# Patient Record
Sex: FEMALE | Race: OTHER | HISPANIC OR LATINO
[De-identification: names, ages, dates, MRNs, and addresses within clinical notes are randomized per-mention and may not be internally consistent; named-entity substitution may affect disease eponyms.]

---

## 2018-06-07 PROBLEM — Z00.00 ENCOUNTER FOR PREVENTIVE HEALTH EXAMINATION: Status: ACTIVE | Noted: 2018-06-07

## 2018-06-08 ENCOUNTER — APPOINTMENT (OUTPATIENT)
Dept: UROGYNECOLOGY | Facility: CLINIC | Age: 68
End: 2018-06-08
Payer: MEDICARE

## 2018-06-08 ENCOUNTER — RESULT REVIEW (OUTPATIENT)
Age: 68
End: 2018-06-08

## 2018-06-08 VITALS
WEIGHT: 108 LBS | SYSTOLIC BLOOD PRESSURE: 144 MMHG | BODY MASS INDEX: 21.77 KG/M2 | HEIGHT: 59 IN | DIASTOLIC BLOOD PRESSURE: 80 MMHG | HEART RATE: 78 BPM

## 2018-06-08 DIAGNOSIS — Z63.4 DISAPPEARANCE AND DEATH OF FAMILY MEMBER: ICD-10-CM

## 2018-06-08 DIAGNOSIS — Z80.49 FAMILY HISTORY OF MALIGNANT NEOPLASM OF OTHER GENITAL ORGANS: ICD-10-CM

## 2018-06-08 DIAGNOSIS — Z78.9 OTHER SPECIFIED HEALTH STATUS: ICD-10-CM

## 2018-06-08 DIAGNOSIS — Z80.41 FAMILY HISTORY OF MALIGNANT NEOPLASM OF OVARY: ICD-10-CM

## 2018-06-08 DIAGNOSIS — I10 ESSENTIAL (PRIMARY) HYPERTENSION: ICD-10-CM

## 2018-06-08 LAB
BILIRUB UR QL STRIP: NORMAL
CLARITY UR: CLEAR
COLLECTION METHOD: NORMAL
GLUCOSE UR-MCNC: NORMAL
HCG UR QL: 0.2 EU/DL
HGB UR QL STRIP.AUTO: NORMAL
KETONES UR-MCNC: NORMAL
LEUKOCYTE ESTERASE UR QL STRIP: NORMAL
NITRITE UR QL STRIP: NORMAL
PH UR STRIP: 5.5
PROT UR STRIP-MCNC: NORMAL
SP GR UR STRIP: 1

## 2018-06-08 PROCEDURE — 99204 OFFICE O/P NEW MOD 45 MIN: CPT

## 2018-06-08 PROCEDURE — 81003 URINALYSIS AUTO W/O SCOPE: CPT | Mod: QW

## 2018-06-08 RX ORDER — RANITIDINE 150 MG/1
150 TABLET ORAL
Qty: 180 | Refills: 0 | Status: DISCONTINUED | COMMUNITY
Start: 2018-01-02

## 2018-06-08 RX ORDER — IBUPROFEN 200 MG/1
TABLET, COATED ORAL
Refills: 0 | Status: ACTIVE | COMMUNITY

## 2018-06-08 RX ORDER — NITROFURANTOIN (MONOHYDRATE/MACROCRYSTALS) 25; 75 MG/1; MG/1
100 CAPSULE ORAL
Qty: 14 | Refills: 0 | Status: DISCONTINUED | COMMUNITY
Start: 2018-02-17

## 2018-06-08 RX ORDER — ATENOLOL 25 MG/1
25 TABLET ORAL
Qty: 90 | Refills: 0 | Status: ACTIVE | COMMUNITY
Start: 2018-06-05

## 2018-06-08 SDOH — SOCIAL STABILITY - SOCIAL INSECURITY: DISSAPEARANCE AND DEATH OF FAMILY MEMBER: Z63.4

## 2018-06-11 ENCOUNTER — RESULT REVIEW (OUTPATIENT)
Age: 68
End: 2018-06-11

## 2018-06-11 LAB
APPEARANCE: CLEAR
BACTERIA UR CULT: NORMAL
BACTERIA: NEGATIVE
BILIRUBIN URINE: NEGATIVE
BLOOD URINE: NEGATIVE
COLOR: YELLOW
GLUCOSE QUALITATIVE U: NEGATIVE MG/DL
HYALINE CASTS: 1 /LPF
KETONES URINE: NEGATIVE
LEUKOCYTE ESTERASE URINE: NEGATIVE
MICROSCOPIC-UA: NORMAL
NITRITE URINE: NEGATIVE
PH URINE: 6.5
PROTEIN URINE: NEGATIVE MG/DL
RED BLOOD CELLS URINE: 0 /HPF
SPECIFIC GRAVITY URINE: 1.01
SQUAMOUS EPITHELIAL CELLS: 1 /HPF
UROBILINOGEN URINE: NEGATIVE MG/DL
WHITE BLOOD CELLS URINE: 0 /HPF

## 2018-06-27 ENCOUNTER — APPOINTMENT (OUTPATIENT)
Dept: UROGYNECOLOGY | Facility: CLINIC | Age: 68
End: 2018-06-27
Payer: MEDICARE

## 2018-06-27 PROCEDURE — 51729 CYSTOMETROGRAM W/VP&UP: CPT | Mod: 26

## 2018-06-27 PROCEDURE — 51784 ANAL/URINARY MUSCLE STUDY: CPT | Mod: 26

## 2018-06-27 PROCEDURE — 51797 INTRAABDOMINAL PRESSURE TEST: CPT | Mod: 26

## 2018-07-02 ENCOUNTER — APPOINTMENT (OUTPATIENT)
Dept: UROGYNECOLOGY | Facility: CLINIC | Age: 68
End: 2018-07-02
Payer: MEDICARE

## 2018-07-02 VITALS — HEART RATE: 72 BPM | DIASTOLIC BLOOD PRESSURE: 80 MMHG | SYSTOLIC BLOOD PRESSURE: 142 MMHG

## 2018-07-02 LAB
BILIRUB UR QL STRIP: NORMAL
CLARITY UR: CLEAR
COLLECTION METHOD: NORMAL
GLUCOSE UR-MCNC: NORMAL
HCG UR QL: 0.2 EU/DL
HGB UR QL STRIP.AUTO: NORMAL
KETONES UR-MCNC: NORMAL
LEUKOCYTE ESTERASE UR QL STRIP: NORMAL
NITRITE UR QL STRIP: NORMAL
PH UR STRIP: 6.5
PROT UR STRIP-MCNC: NORMAL
SP GR UR STRIP: 1.01

## 2018-07-02 PROCEDURE — 52000 CYSTOURETHROSCOPY: CPT

## 2018-07-09 ENCOUNTER — APPOINTMENT (OUTPATIENT)
Dept: UROGYNECOLOGY | Facility: CLINIC | Age: 68
End: 2018-07-09
Payer: MEDICARE

## 2018-07-09 PROCEDURE — 99214 OFFICE O/P EST MOD 30 MIN: CPT

## 2018-08-08 ENCOUNTER — APPOINTMENT (OUTPATIENT)
Dept: UROGYNECOLOGY | Facility: CLINIC | Age: 68
End: 2018-08-08
Payer: MEDICARE

## 2018-08-08 PROCEDURE — 99213 OFFICE O/P EST LOW 20 MIN: CPT | Mod: GC

## 2019-04-29 ENCOUNTER — APPOINTMENT (OUTPATIENT)
Dept: UROGYNECOLOGY | Facility: CLINIC | Age: 69
End: 2019-04-29
Payer: MEDICARE

## 2019-04-29 PROCEDURE — 99214 OFFICE O/P EST MOD 30 MIN: CPT

## 2019-04-29 NOTE — DISCUSSION/SUMMARY
[FreeTextEntry1] : 68 yo with history of outside vaginal mesh placement, followed by MUS requiring revision at [facility] now with recurrent LIVIER and VVA. \par \par 1. VVA- non hormonal and hormonal management options reviewed with the patient and her daughter. The patient desires vaginal estrogen for her VVA. The risks, benefits, the relationship relating to estrogens and cancer were reviewed. She will begin with one tablet daily to the vagina for one week and then change to twice weekly. All questions answered. RTO in 2 months for follow-up.\par \par 2. recurrent LIVIER- We reviewed management options for stress urinary incontinence including: observation, pelvic floor exercises, continence devices, periurethral bulking agents, imipramine, and surgical management. Given her prior history of complications from vaginal mesh and midurethral sling placement we recommended periurethral bulking procedure for management. [employer] patient information on periurethral bulking procedure was provided to her in Albanian. We reviewed risks and benefits of the procedure including efficiency and complication of urinary retention. She desires to read the [employer] information we have provided for her and follow-up in 2 months with her decision. All questions answered.

## 2019-04-29 NOTE — PHYSICAL EXAM
[Well developed] : well developed [Well Nourished] : ~L well nourished [Bulbocavernous] : bulbocavernous was present [Anal Reflex] : the anal reflex was present [Normal Appearance] : ~T the appearance of the neck was normal [Warm and Dry] : was warm and dry to touch [Normal Gait] : gait was normal [Vulvar Atrophy] : vulvar atrophy [Atrophy] : atrophy [Normal] : was normal [Absent] : absent [Exam Deferred] : was deferred [Anxiety] : patient is not anxious [Cough] : no cough [No Edema] : ~T edema was present [Tenderness] : ~T no ~M abdominal tenderness observed [Distended] : not distended [Inguinal LAD] : no adenopathy was noted in the inguinal lymph nodes [FreeTextEntry3] : no mesh exposure, +tenderness at 5 o'clock with contraction band  [de-identified] : no prolapse

## 2019-04-29 NOTE — HISTORY OF PRESENT ILLNESS
[FreeTextEntry1] : 68 yo who presents with her daughter for follow-up. She was last seen in August 2018 for follow-up of her LIVIER. She has an extensive history of pelvic reconstruction surgery at [facility] which includes vaginal mesh, followed by a MUS, followed by a MUS revision. Her UDS revealed GSUI. Cystoscopy wnl. She was placed on imipramine which was increased in dosage at her last office visit which she self discontinued due to it not improving her symptoms. She is inquiring about periurethral bulking agent for management of her LIVIER today. She also reports dyspareunia and desires vaginal estrogen.

## 2019-04-29 NOTE — REASON FOR VISIT
[Follow-up Visit ___] : a follow-up visit  for [unfilled] [Family Member] : family member [Patient Declined  Services] : - None: Patient declined  services

## 2019-05-24 ENCOUNTER — RX RENEWAL (OUTPATIENT)
Age: 69
End: 2019-05-24

## 2019-05-24 RX ORDER — ESTRADIOL 10 UG/1
10 TABLET, FILM COATED VAGINAL
Qty: 30 | Refills: 5 | Status: ACTIVE | COMMUNITY
Start: 2019-04-29 | End: 1900-01-01

## 2019-07-01 ENCOUNTER — APPOINTMENT (OUTPATIENT)
Dept: UROGYNECOLOGY | Facility: CLINIC | Age: 69
End: 2019-07-01
Payer: MEDICARE

## 2019-07-01 PROCEDURE — 99214 OFFICE O/P EST MOD 30 MIN: CPT | Mod: GC

## 2019-07-01 NOTE — HISTORY OF PRESENT ILLNESS
[FreeTextEntry1] : 70yo presents with her daughter. She is s/p vaginal mesh and MUS followed by mesh revision. She presents for follow up for her VVA and LIVIER. She started vagifem with improvement in vaginal dryness and comfort w/ sexual intercours, however she stopped the estrogen therapy due to vaginal spotting. She experiences vaginal spotting every other day for the past 2-3 weeks. She denies any dysuria, fevers or chills, and denies any constipation. Her colonoscopy was last performed 3 years ago with no problems. For control of her LIVIER she was previously on imipramine. Though it did improve her LIVIER symptoms, she has not been taking it regularly. She is not interested in periurethral bulking at this time.

## 2019-07-01 NOTE — DISCUSSION/SUMMARY
[FreeTextEntry1] : 1. VVA - No discernible bleeding noted today. Udip performed with no evidence of hematuria. I explained that bleeding could be from insertion of applicator. She will continue Vagifem however instead of using the applicator she will insert the tablet with her finger. \par \par 2. LIVIER - She will resume Imipramine. She understands that she will need to take it consistently in order to continue seeing improvement. She has deferred periurethral bulking for now. \par \par She will return in 3 months for follow up.

## 2019-07-01 NOTE — REASON FOR VISIT
[Family Member] : family member [Patient Declined  Services] : - None: Patient declined  services [Source: ______] : History obtained from [unfilled]

## 2019-07-01 NOTE — PHYSICAL EXAM
[No Acute Distress] : in no acute distress [Well developed] : well developed [Well Nourished] : ~L well nourished [Oriented x3] : oriented to person, place, and time [Warm and Dry] : was warm and dry to touch [Normal Gait] : gait was normal [Labia Majora] : were normal [Normal Appearance] : general appearance was normal [Absent] : absent [Normal] : no abnormalities [Anxiety] : patient is not anxious [Tenderness] : ~T no ~M abdominal tenderness observed [Distended] : not distended [FreeTextEntry4] : No bleeding sites noted.  [de-identified] : No prolapse. No mesh exposure.

## 2019-07-25 ENCOUNTER — MESSAGE (OUTPATIENT)
Age: 69
End: 2019-07-25

## 2019-10-07 ENCOUNTER — APPOINTMENT (OUTPATIENT)
Dept: UROGYNECOLOGY | Facility: CLINIC | Age: 69
End: 2019-10-07
Payer: MEDICARE

## 2019-10-07 PROCEDURE — 99213 OFFICE O/P EST LOW 20 MIN: CPT

## 2019-10-07 NOTE — HISTORY OF PRESENT ILLNESS
[FreeTextEntry1] : Patient is a 70yo postmenopausal woman s/p vaginal mesh and MUS (followed by mesh revision) who presents for follow up of LIVIER and vulvovaginal atrophy. Currently on vagifem. At last visit reported some vaginal spotting associated with use and was instructed to forego use of applicator at that time. Since last visit reports no vaginal bleeding. However patient desires to switch to cream formulation, as she has taken in the past with reportedly increased symptom improvement. Also reports minimally improved urinary symptoms while on Imipramine. Has not taken the past 2-3 weeks as prescription ran out. ROS positive for new onset vulvar irritation. Denies fevers, chills, dysuria, etc.\par \par Patient interviewed with daughter as . Declines .

## 2019-10-07 NOTE — PHYSICAL EXAM
[Well Nourished] : ~L well nourished [Well developed] : well developed [Good Hygeine] : demonstrates good hygeine [Oriented x3] : oriented to person, place, and time [Warm and Dry] : was warm and dry to touch [Vulvar Atrophy] : vulvar atrophy [Normal Gait] : gait was normal [Labia Minora] : were normal [Labia Majora] : were normal [Atrophy] : atrophy [Normal] : was normal [No Acute Distress] : in acute distress [Distended] : not distended [Mass (___ Cm)] : no ~M [unfilled] abdominal mass was palpated [Tenderness] : ~T no ~M abdominal tenderness observed [Normal Appearance] : general appearance was normal [Axillary LAD] : no adenopathy was noted in axillary nodes [Absent] : absent [FreeTextEntry4] :  short canal

## 2019-10-07 NOTE — DISCUSSION/SUMMARY
[FreeTextEntry1] : Patient is a 70yo postmenopausal woman s/p vaginal mesh and MUS with mesh revision who presents for follow up of LIVIER and VVA. Patient declines any bulking agents at this time. Will continue imipramine. Will prescribe vaginal estrogen cream for vaginal atrophy. Instructed patient to try zinc oxide for vulvar irritation. Return to clinic in 6 months for follow up.

## 2019-10-16 ENCOUNTER — MEDICATION RENEWAL (OUTPATIENT)
Age: 69
End: 2019-10-16

## 2019-10-16 RX ORDER — IMIPRAMINE HYDROCHLORIDE 50 MG/1
50 TABLET ORAL
Qty: 90 | Refills: 0 | Status: ACTIVE | COMMUNITY
Start: 2018-07-09 | End: 1900-01-01

## 2020-07-27 ENCOUNTER — APPOINTMENT (OUTPATIENT)
Dept: UROGYNECOLOGY | Facility: CLINIC | Age: 70
End: 2020-07-27

## 2020-08-17 ENCOUNTER — APPOINTMENT (OUTPATIENT)
Dept: UROGYNECOLOGY | Facility: CLINIC | Age: 70
End: 2020-08-17
Payer: MEDICARE

## 2020-08-17 VITALS — TEMPERATURE: 78.3 F | SYSTOLIC BLOOD PRESSURE: 140 MMHG | DIASTOLIC BLOOD PRESSURE: 80 MMHG | HEART RATE: 73 BPM

## 2020-08-17 PROCEDURE — 99214 OFFICE O/P EST MOD 30 MIN: CPT

## 2020-08-17 NOTE — DISCUSSION/SUMMARY
[FreeTextEntry1] : For her vaginal bleeding, we discussed the etiology of mesh exposure vs vaginal atrophy. She will continue vaginal estrogen. We discussed continuing with vaginal estrogen only versus evaluating for Mesh erosion and surgical excision with vaginoscopy.We discussed the short vagina may be contributing to her dyspareunia. She would like to proceed with vaginoscopy. All questions were answered

## 2020-08-17 NOTE — PHYSICAL EXAM
[Chaperone Present] : A chaperone was present in the examining room during all aspects of the physical examination [No Acute Distress] : in no acute distress [Oriented x3] : oriented to person, place, and time [Normal Gait] : gait was normal [Labia Majora] : were normal [Atrophy] : atrophy [Normal Appearance] : general appearance was normal [Absent] : absent [Normal] : no abnormalities [Exam Deferred] : was deferred [No Edema] : ~T edema was present [Mass (___ Cm)] : no ~M [unfilled] abdominal mass was palpated [Tenderness] : ~T no ~M abdominal tenderness observed [Distended] : not distended [FreeTextEntry4] : No visible mesh seen or palpable however bleeding seemed to arise from right anterior sulci. Short vagina + 5cm

## 2020-08-17 NOTE — HISTORY OF PRESENT ILLNESS
[Cystocele (Obstetric)] : mild [Uterine Prolapse] : no [Stress Incontinence] : no [Unable To Restrain Bowel Movement] : no [Urinary Frequency] : no [Pain During Urination (Dysuria)] : no [Feelings Of Urinary Urgency] : no [x1] : nocturia once nightly [Urinary Tract Infection] : no [Constipation Obstructed Defecation] : no [Pelvic Pain] : no [Stool Visible Blood] : no [Incomplete Emptying Of Stool] : no [Vaginal Pain] : no [Rectal Pain] : no [Bladder Pain] : no [Vulvar Pain] : no [Back Pain] : no [Vaginal Wall Prolapse] : moderate [] : months ago [FreeTextEntry4] : daily for 6 months  [de-identified] : 6 [de-identified] : daily [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 She reports 6 months vaginal bleeding, minimal saturation 1 liner per day. last sexual intercourse 1 week ago, increased bleeding and pain. Saw gynecologist 1 month ago for a vaginal  laceration. Stopped vaginal estrogen 1 week ago. \par \par Previously Prescribed imipramine, stopped the medication, after 3 months, stopped after the Dr. Nix gave her another medication in 6/2020 (unsure of medication). \par \par Cysto 2018- negative \par UDS stress incontinence \par \par 2/23/2015- TOT - Dr. Nix \par 5/2016- excision of urethral sling (erosion) - Dr. Nix\par 2017- release of band?, mesh exposure?

## 2020-08-17 NOTE — REASON FOR VISIT
[Questionnaire Received] : Patient questionnaire received [Intake Form Reviewed] : Patient intake form with past medical history, surgical history, family history and social history reviewed today [Follow-Up Visit_____] : a follow-up visit for [unfilled] [________] : [unfilled] [Declined  Services] : Patient was offered free  services in ~his/her~ preferred language and declined services. Patient insisted on family member providing interpretation   [FreeTextEntry3] : patients daughter interpreted [TWNoteComboBox1] : Kazakh

## 2020-09-02 ENCOUNTER — APPOINTMENT (OUTPATIENT)
Dept: UROGYNECOLOGY | Facility: CLINIC | Age: 70
End: 2020-09-02
Payer: MEDICARE

## 2020-09-02 ENCOUNTER — OUTPATIENT (OUTPATIENT)
Dept: OUTPATIENT SERVICES | Facility: HOSPITAL | Age: 70
LOS: 1 days | End: 2020-09-02
Payer: COMMERCIAL

## 2020-09-02 DIAGNOSIS — N95.0 POSTMENOPAUSAL BLEEDING: ICD-10-CM

## 2020-09-02 DIAGNOSIS — N95.2 POSTMENOPAUSAL ATROPHIC VAGINITIS: ICD-10-CM

## 2020-09-02 DIAGNOSIS — Z01.818 ENCOUNTER FOR OTHER PREPROCEDURAL EXAMINATION: ICD-10-CM

## 2020-09-02 LAB
BILIRUB UR QL STRIP: NORMAL
CLARITY UR: CLEAR
COLLECTION METHOD: NORMAL
GLUCOSE UR-MCNC: NORMAL
HCG UR QL: 0.2 EU/DL
HGB UR QL STRIP.AUTO: NORMAL
KETONES UR-MCNC: NORMAL
LEUKOCYTE ESTERASE UR QL STRIP: NORMAL
NITRITE UR QL STRIP: NORMAL
PH UR STRIP: 7
PROT UR STRIP-MCNC: NORMAL
SP GR UR STRIP: 1.01

## 2020-09-02 PROCEDURE — 57420 EXAM OF VAGINA W/SCOPE: CPT

## 2020-12-09 ENCOUNTER — APPOINTMENT (OUTPATIENT)
Dept: UROGYNECOLOGY | Facility: CLINIC | Age: 70
End: 2020-12-09

## 2022-07-20 ENCOUNTER — APPOINTMENT (OUTPATIENT)
Dept: UROLOGY | Facility: CLINIC | Age: 72
End: 2022-07-20

## 2022-07-20 VITALS
TEMPERATURE: 97.2 F | SYSTOLIC BLOOD PRESSURE: 147 MMHG | WEIGHT: 118 LBS | HEIGHT: 59 IN | BODY MASS INDEX: 23.79 KG/M2 | HEART RATE: 78 BPM | DIASTOLIC BLOOD PRESSURE: 86 MMHG

## 2022-07-20 DIAGNOSIS — Z86.79 PERSONAL HISTORY OF OTHER DISEASES OF THE CIRCULATORY SYSTEM: ICD-10-CM

## 2022-07-20 PROCEDURE — 99204 OFFICE O/P NEW MOD 45 MIN: CPT

## 2022-07-20 RX ORDER — ATORVASTATIN CALCIUM 10 MG/1
10 TABLET, FILM COATED ORAL
Qty: 30 | Refills: 0 | Status: ACTIVE | COMMUNITY
Start: 2022-05-04

## 2022-07-20 RX ORDER — DENOSUMAB 60 MG/ML
60 INJECTION SUBCUTANEOUS
Qty: 1 | Refills: 0 | Status: ACTIVE | COMMUNITY
Start: 2022-06-03

## 2022-07-20 RX ORDER — MIRABEGRON 25 MG/1
25 TABLET, FILM COATED, EXTENDED RELEASE ORAL
Refills: 0 | Status: ACTIVE | COMMUNITY

## 2022-07-20 RX ORDER — FLUOCINONIDE 0.5 MG/ML
0.05 SOLUTION TOPICAL
Qty: 60 | Refills: 0 | Status: ACTIVE | COMMUNITY
Start: 2022-06-21

## 2022-07-20 RX ORDER — POLYETHYLENE GLYCOL 3350, SODIUM CHLORIDE, SODIUM BICARBONATE AND POTASSIUM CHLORIDE WITH LEMON FLAVOR 420; 11.2; 5.72; 1.48 G/4L; G/4L; G/4L; G/4L
420 POWDER, FOR SOLUTION ORAL
Qty: 4000 | Refills: 0 | Status: ACTIVE | COMMUNITY
Start: 2022-02-21

## 2022-07-20 RX ORDER — SERTRALINE 25 MG/1
25 TABLET, FILM COATED ORAL
Qty: 30 | Refills: 0 | Status: ACTIVE | COMMUNITY
Start: 2022-03-29

## 2022-07-20 RX ORDER — BISACODYL 5 MG/1
5 TABLET ORAL
Qty: 2 | Refills: 0 | Status: ACTIVE | COMMUNITY
Start: 2022-02-21

## 2022-07-20 RX ORDER — LOSARTAN POTASSIUM AND HYDROCHLOROTHIAZIDE 12.5; 1 MG/1; MG/1
100-12.5 TABLET ORAL
Qty: 90 | Refills: 0 | Status: ACTIVE | COMMUNITY
Start: 2022-07-10

## 2022-07-20 RX ORDER — LEVOTHYROXINE SODIUM 0.05 MG/1
50 TABLET ORAL
Qty: 64 | Refills: 0 | Status: ACTIVE | COMMUNITY
Start: 2022-06-15

## 2022-07-20 RX ORDER — CYCLOSPORINE 0.5 MG/ML
0.05 EMULSION OPHTHALMIC
Qty: 60 | Refills: 0 | Status: ACTIVE | COMMUNITY
Start: 2022-07-09

## 2022-07-20 RX ORDER — TRETINOIN 0.5 MG/G
0.05 CREAM TOPICAL
Qty: 45 | Refills: 0 | Status: ACTIVE | COMMUNITY
Start: 2022-06-21

## 2022-07-20 NOTE — ASSESSMENT
[FreeTextEntry1] : Ms. North is a very pleasant 72 year old woman here today for history of stress incontinence and increased urgency.\par She reports that she was seeing Dr. Castorena regularly for this but he moved offices and it is difficult for her to get to his new location.\par Reports that her stress incontinence has gotten slightly better with medication therapies.\par Takes 25 mg Myrbetriq daily as well as Estrace cream.\par Has a history of vaginal mesh insertion with removal due to erosion.\par Most recently had a vaginoscopy with Dr. Castorena in 2020 that showed no more evidence of vaginal mesh.\par Denies any hematuria, dysuria.\par Reports occasional pelvic pain/discomfort.\par UC.\par UA.\par Continue Estrace cream - refills provided.\par Trial Myrbetriq 50 mg.\par We discussed the risks and benefits of mirabegron, including the need to check blood pressure more frequently when starting the medication\par RTO in 1 month.\par

## 2022-07-20 NOTE — HISTORY OF PRESENT ILLNESS
[Currently Experiencing ___] :  [unfilled] [Urinary Incontinence] : urinary incontinence [None] : None [FreeTextEntry1] : Portuguese interpretation services offered to the patient, however she wished to speak without the use of an \par \par Ms. North is a very pleasant 72 year old woman here today for history of stress incontinence and and increased urgency.\par She reports that she was seeing Dr. Castorena regularly for this but he moved offices and it is difficult for her to get to his new location.\par Reports that her stress incontinence has gotten slightly better with medication therapies.\par Takes 25 mg Myrbetriq daily as well as Estrace cream.\par Has a history of vaginal mesh insertion with removal due to erosion.\par Most recently had a vaginoscopy with Dr. Castorena in 2020 that showed no more evidence of vaginal mesh.\par Denies any hematuria, dysuria.\par Reports occasional pelvic pain/discomfort.

## 2022-07-20 NOTE — REVIEW OF SYSTEMS
[Leakage of urine with urgency] : leakage of urine with urgency [Leakage of urine with straining, coughing, laughing] : leakage of urine with straining, coughing, laughing [Negative] : Heme/Lymph [FreeTextEntry6] : unable to hold urine  / has some discomfort on bladder

## 2022-07-20 NOTE — PHYSICAL EXAM
[General Appearance - Well Developed] : well developed [General Appearance - Well Nourished] : well nourished [Normal Appearance] : normal appearance [General Appearance - In No Acute Distress] : no acute distress [Well Groomed] : well groomed [Edema] : no peripheral edema [Respiration, Rhythm And Depth] : normal respiratory rhythm and effort [Exaggerated Use Of Accessory Muscles For Inspiration] : no accessory muscle use [Abdomen Soft] : soft [Abdomen Tenderness] : non-tender [Costovertebral Angle Tenderness] : no ~M costovertebral angle tenderness [Normal Station and Gait] : the gait and station were normal for the patient's age [] : no rash [No Focal Deficits] : no focal deficits [Oriented To Time, Place, And Person] : oriented to person, place, and time [Affect] : the affect was normal [Mood] : the mood was normal [Not Anxious] : not anxious [No Palpable Adenopathy] : no palpable adenopathy

## 2022-07-25 LAB
APPEARANCE: CLEAR
BACTERIA UR CULT: ABNORMAL
BACTERIA: ABNORMAL
BILIRUBIN URINE: NEGATIVE
BLOOD URINE: NEGATIVE
COLOR: NORMAL
GLUCOSE QUALITATIVE U: NEGATIVE
HYALINE CASTS: 0 /LPF
KETONES URINE: NEGATIVE
LEUKOCYTE ESTERASE URINE: NEGATIVE
MICROSCOPIC-UA: NORMAL
NITRITE URINE: NEGATIVE
PH URINE: 7
PROTEIN URINE: NEGATIVE
RED BLOOD CELLS URINE: 0 /HPF
SPECIFIC GRAVITY URINE: 1.01
SQUAMOUS EPITHELIAL CELLS: 2 /HPF
UROBILINOGEN URINE: NORMAL
WHITE BLOOD CELLS URINE: 0 /HPF

## 2022-07-25 RX ORDER — AMOXICILLIN AND CLAVULANATE POTASSIUM 875; 125 MG/1; MG/1
875-125 TABLET, COATED ORAL TWICE DAILY
Qty: 10 | Refills: 0 | Status: ACTIVE | COMMUNITY
Start: 2022-07-25 | End: 1900-01-01

## 2022-08-23 ENCOUNTER — APPOINTMENT (OUTPATIENT)
Dept: UROLOGY | Facility: CLINIC | Age: 72
End: 2022-08-23

## 2022-08-23 VITALS
HEART RATE: 94 BPM | DIASTOLIC BLOOD PRESSURE: 77 MMHG | BODY MASS INDEX: 20.6 KG/M2 | TEMPERATURE: 97.4 F | OXYGEN SATURATION: 100 % | WEIGHT: 102 LBS | SYSTOLIC BLOOD PRESSURE: 132 MMHG

## 2022-08-23 DIAGNOSIS — N39.0 URINARY TRACT INFECTION, SITE NOT SPECIFIED: ICD-10-CM

## 2022-08-23 PROCEDURE — 99213 OFFICE O/P EST LOW 20 MIN: CPT

## 2022-08-23 NOTE — ASSESSMENT
[FreeTextEntry1] : Very pleasant 72-year-old woman who presents for follow-up of stress incontinence, increased urinary urgency\par -Continue Estrace cream\par -Continue mirabegron, however decrease the dose of the medication to 25 mg\par -Urinalysis demonstrated 0 red blood cells per high-powered field but moderate bacteria\par -Urine culture demonstrated an Enterococcus faecalis urinary tract infection sensitive to Augmentin which she completed\par -Repeat urine culture today\par -Follow-up in 6 months if urine culture is negative

## 2022-08-23 NOTE — HISTORY OF PRESENT ILLNESS
[FreeTextEntry1] : Wolof interpretation services offered to the patient, however she wished to speak without the use of an \par \par Very pleasant 72 year old woman here today for follow up of stress incontinence and and increased urgency.\par She was previously seeing Dr. Castorena regularly for this but he moved offices and it is difficult for her to get to his new location.\par She reports stable stress incontinence.\par Uses Estrace cream.\par Has a history of vaginal mesh insertion with removal due to erosion.\par Most recently had a vaginoscopy with Dr. Castorena in 2020 that showed no more evidence of vaginal mesh.\par Denies any hematuria, dysuria.\par Reports occasional pelvic pain/discomfort.\par \par At last visit the dose of mirabegron was increased from 25 mg to 50 mg.  She reports no change in her symptoms with the increased dose of the medication

## 2022-08-29 ENCOUNTER — TRANSCRIPTION ENCOUNTER (OUTPATIENT)
Age: 72
End: 2022-08-29

## 2022-08-29 RX ORDER — MIRABEGRON 50 MG/1
50 TABLET, FILM COATED, EXTENDED RELEASE ORAL
Qty: 30 | Refills: 0 | Status: DISCONTINUED | COMMUNITY
Start: 2022-07-20 | End: 2022-08-29

## 2022-08-29 RX ORDER — MIRABEGRON 25 MG/1
25 TABLET, FILM COATED, EXTENDED RELEASE ORAL
Qty: 90 | Refills: 1 | Status: ACTIVE | COMMUNITY
Start: 2022-08-29 | End: 1900-01-01

## 2023-02-24 ENCOUNTER — APPOINTMENT (OUTPATIENT)
Dept: UROLOGY | Facility: CLINIC | Age: 73
End: 2023-02-24

## 2023-05-24 ENCOUNTER — APPOINTMENT (OUTPATIENT)
Dept: UROLOGY | Facility: CLINIC | Age: 73
End: 2023-05-24
Payer: MEDICARE

## 2023-05-24 VITALS
TEMPERATURE: 97.1 F | OXYGEN SATURATION: 99 % | SYSTOLIC BLOOD PRESSURE: 139 MMHG | HEART RATE: 104 BPM | HEIGHT: 59 IN | DIASTOLIC BLOOD PRESSURE: 84 MMHG | WEIGHT: 102 LBS | BODY MASS INDEX: 20.56 KG/M2

## 2023-05-24 DIAGNOSIS — R35.1 NOCTURIA: ICD-10-CM

## 2023-05-24 DIAGNOSIS — N39.3 STRESS INCONTINENCE (FEMALE) (MALE): ICD-10-CM

## 2023-05-24 PROCEDURE — 99214 OFFICE O/P EST MOD 30 MIN: CPT

## 2023-05-24 RX ORDER — ESTRADIOL 0.1 MG/G
0.1 CREAM VAGINAL
Qty: 1 | Refills: 3 | Status: ACTIVE | COMMUNITY
Start: 2019-10-16 | End: 1900-01-01

## 2023-05-24 NOTE — HISTORY OF PRESENT ILLNESS
[FreeTextEntry1] : Mrs. North is a very pleasant Azeri speaking female who presents for follow up evaluation of stress incontinence and urinary urgency.\par She states she has been taking myrbetriq and using estrace cream\par Daytime frequency: 8-10\par Nighttime frequency: 3-4\par PPD: 1\par States that she feels the myrbetriq helps with her urge incontinence.\par Stable stress incontinence \par She feels like she wants more help with her urinary frequency.\par \par \par

## 2023-05-24 NOTE — ASSESSMENT
[FreeTextEntry1] : Mrs. North is a very pleasant Divehi speaking female who presents for follow up evaluation of stress incontinence and urinary urgency.\par She states she has been taking myrbetriq and using estrace cream\par Daytime frequency: 8-10\par Nighttime frequency: 3-4\par PPD: 1\par States that she feels the myrbetriq helps with her urge incontinence.\par Stable stress incontinence \par She feels like she wants more help with her urinary frequency.\par \par Continue Estrace cream\par Stop taking myrbetriq 25mg\par Start taking gemtesa.\par We discussed the risks and benefits of Gemtesa

## 2023-05-24 NOTE — PHYSICAL EXAM
[General Appearance - Well Developed] : well developed [Normal Appearance] : normal appearance [Well Groomed] : well groomed [] : no respiratory distress [Affect] : the affect was normal [Mood] : the mood was normal [Normal Station and Gait] : the gait and station were normal for the patient's age

## 2023-06-27 ENCOUNTER — APPOINTMENT (OUTPATIENT)
Dept: UROLOGY | Facility: CLINIC | Age: 73
End: 2023-06-27
Payer: MEDICARE

## 2023-06-27 VITALS
OXYGEN SATURATION: 99 % | WEIGHT: 102 LBS | HEIGHT: 59 IN | BODY MASS INDEX: 20.56 KG/M2 | SYSTOLIC BLOOD PRESSURE: 133 MMHG | HEART RATE: 87 BPM | RESPIRATION RATE: 15 BRPM | TEMPERATURE: 96.7 F | DIASTOLIC BLOOD PRESSURE: 79 MMHG

## 2023-06-27 DIAGNOSIS — N39.46 MIXED INCONTINENCE: ICD-10-CM

## 2023-06-27 DIAGNOSIS — R35.0 FREQUENCY OF MICTURITION: ICD-10-CM

## 2023-06-27 PROCEDURE — 99213 OFFICE O/P EST LOW 20 MIN: CPT

## 2023-06-27 RX ORDER — VIBEGRON 75 MG/1
75 TABLET, FILM COATED ORAL
Qty: 90 | Refills: 1 | Status: ACTIVE | COMMUNITY
Start: 2023-05-24 | End: 1900-01-01

## 2023-06-27 NOTE — HISTORY OF PRESENT ILLNESS
[None] : None [FreeTextEntry1] : Ms. North is a very pleasant 73 year old woman here today for urinary urgency.\par She reports feeling okay since she was here last.\par Reports improvement in urinary urgency on Gemtesa.\par Reports that any dysuria has improved significantly.\par Denies any hematuria.

## 2023-06-27 NOTE — ASSESSMENT
[FreeTextEntry1] : Ms. North is a very pleasant 73 year old woman here today for urinary urgency.\par She reports feeling okay since she was here last.\par Reports improvement in urinary urgency on Gemtesa.\par Reports that any dysuria has improved significantly.\par Denies any hematuria.\par Continue Gemtesa - refills sent to pharmacy.\par RTO in 6 months.

## 2023-12-29 ENCOUNTER — APPOINTMENT (OUTPATIENT)
Dept: UROLOGY | Facility: CLINIC | Age: 73
End: 2023-12-29